# Patient Record
Sex: FEMALE | Race: OTHER | HISPANIC OR LATINO
[De-identification: names, ages, dates, MRNs, and addresses within clinical notes are randomized per-mention and may not be internally consistent; named-entity substitution may affect disease eponyms.]

---

## 2018-04-26 ENCOUNTER — APPOINTMENT (OUTPATIENT)
Dept: ANTEPARTUM | Facility: CLINIC | Age: 23
End: 2018-04-26
Payer: MEDICAID

## 2018-04-26 ENCOUNTER — ASOB RESULT (OUTPATIENT)
Age: 23
End: 2018-04-26

## 2018-04-26 PROCEDURE — 76817 TRANSVAGINAL US OBSTETRIC: CPT

## 2018-05-02 ENCOUNTER — APPOINTMENT (OUTPATIENT)
Dept: ANTEPARTUM | Facility: CLINIC | Age: 23
End: 2018-05-02
Payer: MEDICAID

## 2018-05-02 ENCOUNTER — ASOB RESULT (OUTPATIENT)
Age: 23
End: 2018-05-02

## 2018-05-02 PROCEDURE — 76817 TRANSVAGINAL US OBSTETRIC: CPT

## 2018-06-18 ENCOUNTER — APPOINTMENT (OUTPATIENT)
Dept: ANTEPARTUM | Facility: CLINIC | Age: 23
End: 2018-06-18
Payer: MEDICAID

## 2018-06-18 ENCOUNTER — ASOB RESULT (OUTPATIENT)
Age: 23
End: 2018-06-18

## 2018-06-18 PROCEDURE — 76813 OB US NUCHAL MEAS 1 GEST: CPT

## 2018-06-18 PROCEDURE — 76801 OB US < 14 WKS SINGLE FETUS: CPT

## 2018-06-18 PROCEDURE — 36416 COLLJ CAPILLARY BLOOD SPEC: CPT

## 2018-06-22 LAB
1ST TRIMESTER DATA: NORMAL
ADDENDUM DOC: NORMAL
AFP PNL SERPL: NORMAL
AFP SERPL-ACNC: NORMAL
CLINICAL BIOCHEMIST REVIEW: NORMAL
FREE BETA HCG 1ST TRIMESTER: NORMAL
Lab: NORMAL
NASAL BONE: PRESENT
NOTES NTD: NORMAL
NT: NORMAL
PAPP-A SERPL-ACNC: NORMAL
TRISOMY 18/3: NORMAL

## 2018-08-13 ENCOUNTER — ASOB RESULT (OUTPATIENT)
Age: 23
End: 2018-08-13

## 2018-08-13 ENCOUNTER — APPOINTMENT (OUTPATIENT)
Dept: ANTEPARTUM | Facility: CLINIC | Age: 23
End: 2018-08-13
Payer: MEDICAID

## 2018-08-13 PROCEDURE — 76811 OB US DETAILED SNGL FETUS: CPT

## 2018-08-13 PROCEDURE — 76817 TRANSVAGINAL US OBSTETRIC: CPT

## 2018-10-15 ENCOUNTER — ASOB RESULT (OUTPATIENT)
Age: 23
End: 2018-10-15

## 2018-10-15 ENCOUNTER — APPOINTMENT (OUTPATIENT)
Dept: ANTEPARTUM | Facility: CLINIC | Age: 23
End: 2018-10-15
Payer: MEDICAID

## 2018-10-15 PROCEDURE — 76816 OB US FOLLOW-UP PER FETUS: CPT

## 2018-11-09 ENCOUNTER — APPOINTMENT (OUTPATIENT)
Dept: ANTEPARTUM | Facility: CLINIC | Age: 23
End: 2018-11-09
Payer: MEDICAID

## 2018-11-09 ENCOUNTER — ASOB RESULT (OUTPATIENT)
Age: 23
End: 2018-11-09

## 2018-11-09 ENCOUNTER — APPOINTMENT (OUTPATIENT)
Dept: ANTEPARTUM | Facility: CLINIC | Age: 23
End: 2018-11-09

## 2018-11-09 ENCOUNTER — APPOINTMENT (OUTPATIENT)
Dept: MATERNAL FETAL MEDICINE | Facility: CLINIC | Age: 23
End: 2018-11-09
Payer: MEDICAID

## 2018-11-09 VITALS
WEIGHT: 113 LBS | BODY MASS INDEX: 24.38 KG/M2 | HEIGHT: 57 IN | SYSTOLIC BLOOD PRESSURE: 88 MMHG | DIASTOLIC BLOOD PRESSURE: 52 MMHG | HEART RATE: 66 BPM

## 2018-11-09 DIAGNOSIS — O26.613 LIVER AND BILIARY TRACT DISORDERS IN PREGNANCY, THIRD TRIMESTER: ICD-10-CM

## 2018-11-09 DIAGNOSIS — Z87.898 PERSONAL HISTORY OF OTHER SPECIFIED CONDITIONS: ICD-10-CM

## 2018-11-09 DIAGNOSIS — K83.1 LIVER AND BILIARY TRACT DISORDERS IN PREGNANCY, THIRD TRIMESTER: ICD-10-CM

## 2018-11-09 DIAGNOSIS — Z3A.33 33 WEEKS GESTATION OF PREGNANCY: ICD-10-CM

## 2018-11-09 DIAGNOSIS — Z78.9 OTHER SPECIFIED HEALTH STATUS: ICD-10-CM

## 2018-11-09 DIAGNOSIS — Z87.39 PERSONAL HISTORY OF OTHER DISEASES OF THE MUSCULOSKELETAL SYSTEM AND CONNECTIVE TISSUE: ICD-10-CM

## 2018-11-09 DIAGNOSIS — A74.9 CHLAMYDIAL INFECTION, UNSPECIFIED: ICD-10-CM

## 2018-11-09 DIAGNOSIS — Z87.19 PERSONAL HISTORY OF OTHER COMPLICATIONS OF PREGNANCY, CHILDBIRTH AND THE PUERPERIUM: ICD-10-CM

## 2018-11-09 DIAGNOSIS — Z82.49 FAMILY HISTORY OF ISCHEMIC HEART DISEASE AND OTHER DISEASES OF THE CIRCULATORY SYSTEM: ICD-10-CM

## 2018-11-09 DIAGNOSIS — Z82.5 FAMILY HISTORY OF ASTHMA AND OTHER CHRONIC LOWER RESPIRATORY DISEASES: ICD-10-CM

## 2018-11-09 DIAGNOSIS — Z87.59 PERSONAL HISTORY OF OTHER COMPLICATIONS OF PREGNANCY, CHILDBIRTH AND THE PUERPERIUM: ICD-10-CM

## 2018-11-09 PROCEDURE — 76816 OB US FOLLOW-UP PER FETUS: CPT

## 2018-11-09 PROCEDURE — 99203 OFFICE O/P NEW LOW 30 MIN: CPT

## 2018-11-09 PROCEDURE — 76819 FETAL BIOPHYS PROFIL W/O NST: CPT

## 2018-11-09 RX ORDER — URSODIOL 250 MG/1
250 TABLET ORAL 3 TIMES DAILY
Refills: 0 | Status: ACTIVE | COMMUNITY
Start: 2018-11-09

## 2018-11-09 RX ORDER — MULTIVIT-MIN/FOLIC/VIT K/LYCOP 400-300MCG
28-0.8 TABLET ORAL DAILY
Refills: 0 | Status: ACTIVE | COMMUNITY
Start: 2018-11-09

## 2018-11-13 ENCOUNTER — APPOINTMENT (OUTPATIENT)
Dept: ANTEPARTUM | Facility: CLINIC | Age: 23
End: 2018-11-13
Payer: MEDICAID

## 2018-11-13 ENCOUNTER — ASOB RESULT (OUTPATIENT)
Age: 23
End: 2018-11-13

## 2018-11-13 PROCEDURE — 76815 OB US LIMITED FETUS(S): CPT

## 2018-11-13 PROCEDURE — 76819 FETAL BIOPHYS PROFIL W/O NST: CPT

## 2018-11-15 ENCOUNTER — APPOINTMENT (OUTPATIENT)
Dept: ANTEPARTUM | Facility: CLINIC | Age: 23
End: 2018-11-15
Payer: MEDICAID

## 2018-11-15 ENCOUNTER — ASOB RESULT (OUTPATIENT)
Age: 23
End: 2018-11-15

## 2018-11-15 PROCEDURE — 76818 FETAL BIOPHYS PROFILE W/NST: CPT

## 2018-11-20 ENCOUNTER — APPOINTMENT (OUTPATIENT)
Dept: ANTEPARTUM | Facility: CLINIC | Age: 23
End: 2018-11-20

## 2018-11-26 ENCOUNTER — APPOINTMENT (OUTPATIENT)
Dept: ANTEPARTUM | Facility: CLINIC | Age: 23
End: 2018-11-26
Payer: MEDICAID

## 2018-11-26 ENCOUNTER — APPOINTMENT (OUTPATIENT)
Dept: ANTEPARTUM | Facility: CLINIC | Age: 23
End: 2018-11-26

## 2018-11-26 ENCOUNTER — ASOB RESULT (OUTPATIENT)
Age: 23
End: 2018-11-26

## 2018-11-26 PROCEDURE — 76815 OB US LIMITED FETUS(S): CPT

## 2018-11-26 PROCEDURE — 76818 FETAL BIOPHYS PROFILE W/NST: CPT

## 2018-11-27 ENCOUNTER — APPOINTMENT (OUTPATIENT)
Dept: ANTEPARTUM | Facility: CLINIC | Age: 23
End: 2018-11-27

## 2018-12-03 ENCOUNTER — APPOINTMENT (OUTPATIENT)
Dept: ANTEPARTUM | Facility: CLINIC | Age: 23
End: 2018-12-03
Payer: MEDICAID

## 2018-12-03 ENCOUNTER — ASOB RESULT (OUTPATIENT)
Age: 23
End: 2018-12-03

## 2018-12-03 PROCEDURE — 76818 FETAL BIOPHYS PROFILE W/NST: CPT

## 2018-12-03 PROCEDURE — 76816 OB US FOLLOW-UP PER FETUS: CPT

## 2018-12-04 ENCOUNTER — APPOINTMENT (OUTPATIENT)
Dept: ANTEPARTUM | Facility: CLINIC | Age: 23
End: 2018-12-04

## 2018-12-06 ENCOUNTER — INPATIENT (INPATIENT)
Facility: HOSPITAL | Age: 23
LOS: 2 days | Discharge: HOME | End: 2018-12-09
Attending: OBSTETRICS & GYNECOLOGY | Admitting: OBSTETRICS & GYNECOLOGY
Payer: MEDICAID

## 2018-12-06 VITALS — DIASTOLIC BLOOD PRESSURE: 76 MMHG | HEART RATE: 105 BPM | SYSTOLIC BLOOD PRESSURE: 124 MMHG

## 2018-12-06 DIAGNOSIS — O47.1 FALSE LABOR AT OR AFTER 37 COMPLETED WEEKS OF GESTATION: ICD-10-CM

## 2018-12-06 RX ORDER — SODIUM CHLORIDE 9 MG/ML
1000 INJECTION, SOLUTION INTRAVENOUS
Qty: 0 | Refills: 0 | Status: DISCONTINUED | OUTPATIENT
Start: 2018-12-06 | End: 2018-12-07

## 2018-12-06 RX ORDER — SODIUM CHLORIDE 9 MG/ML
1000 INJECTION, SOLUTION INTRAVENOUS ONCE
Qty: 0 | Refills: 0 | Status: DISCONTINUED | OUTPATIENT
Start: 2018-12-06 | End: 2018-12-07

## 2018-12-06 RX ORDER — OXYTOCIN 10 UNIT/ML
333.33 VIAL (ML) INJECTION
Qty: 20 | Refills: 0 | Status: COMPLETED | OUTPATIENT
Start: 2018-12-06

## 2018-12-06 RX ORDER — CITRIC ACID/SODIUM CITRATE 300-500 MG
30 SOLUTION, ORAL ORAL ONCE
Qty: 0 | Refills: 0 | Status: DISCONTINUED | OUTPATIENT
Start: 2018-12-06 | End: 2018-12-07

## 2018-12-06 NOTE — OB PROVIDER H&P - ASSESSMENT
Pt is a 22yo  at 38w1d consistent with LMP presents to L&D for induction of labor for cholestasis via pitocin    AURE: 18  Prenatal course complicated by cholestasis.    OB hx: Vd x1 Gyn hx: hx of abnormal PAP PMHx/PSHx: anemia, parvovirus, scoliosis Meds: PNV Allergies: NKDA  	GBS: neg HIV: NR RPR: NR Rubella: non- immune HepB: NR ABO: A+

## 2018-12-06 NOTE — OB PROVIDER H&P - NSHPLABSRESULTS_GEN_ALL_CORE
Vital Signs Last 24 Hrs  T(C): 36.8 (06 Dec 2018 22:28), Max: 36.8 (06 Dec 2018 22:28)  T(F): 98.2 (06 Dec 2018 22:28), Max: 98.2 (06 Dec 2018 22:28)  HR: 89 (06 Dec 2018 22:28) (89 - 105)  BP: 127/76 (06 Dec 2018 22:28) (124/76 - 127/76)  BP(mean): --  RR: 20 (06 Dec 2018 22:28) (20 - 20)  SpO2: --

## 2018-12-06 NOTE — OB PROVIDER H&P - HISTORY OF PRESENT ILLNESS
Pt is a 22yo  at 38w1d consistent with LMP presents to L&D for induction of labor for cholestasis.    AURE: 18  Prenatal course complicated by cholestasis.

## 2018-12-06 NOTE — OB PROVIDER IHI INDUCTION/AUGMENTATION NOTE - NS_CHECKALL_OBGYN_ALL_OB
FHR was reviewed/H&P was completed/Contractions pattern was reviewed/Induction / Augmentation was discussed/Order was written

## 2018-12-06 NOTE — OB PROVIDER H&P - ATTENDING COMMENTS
I have fully discussed this case with Dr. Haas. I agree with the plan of action for induction of labor secondary to a dx of cholestasis of pregnancy. The pt was examined and found to be 3cm dilated. We will start IV pitocin to augment labor

## 2018-12-07 LAB
APPEARANCE UR: CLEAR — SIGNIFICANT CHANGE UP
BACTERIA # UR AUTO: ABNORMAL
BASOPHILS # BLD AUTO: 0 K/UL — SIGNIFICANT CHANGE UP (ref 0–0.2)
BASOPHILS # BLD AUTO: 0 K/UL — SIGNIFICANT CHANGE UP (ref 0–0.2)
BASOPHILS NFR BLD AUTO: 0.2 % — SIGNIFICANT CHANGE UP (ref 0–2)
BASOPHILS NFR BLD AUTO: 0.3 % — SIGNIFICANT CHANGE UP (ref 0–2)
BILIRUB UR-MCNC: ABNORMAL
BLD GP AB SCN SERPL QL: SIGNIFICANT CHANGE UP
COLOR SPEC: ABNORMAL
COMMENT - URINE: SIGNIFICANT CHANGE UP
DIFF PNL FLD: ABNORMAL
EOSINOPHIL # BLD AUTO: 0 K/UL — SIGNIFICANT CHANGE UP (ref 0–0.5)
EOSINOPHIL # BLD AUTO: 0 K/UL — SIGNIFICANT CHANGE UP (ref 0–0.5)
EOSINOPHIL NFR BLD AUTO: 0.3 % — SIGNIFICANT CHANGE UP (ref 0–6)
EOSINOPHIL NFR BLD AUTO: 0.3 % — SIGNIFICANT CHANGE UP (ref 0–6)
EPI CELLS # UR: SIGNIFICANT CHANGE UP
GLUCOSE UR QL: NEGATIVE MG/DL — SIGNIFICANT CHANGE UP
HCT VFR BLD CALC: 30.4 % — LOW (ref 37–47)
HCT VFR BLD CALC: 33.1 % — LOW (ref 37–47)
HGB BLD-MCNC: 10 G/DL — LOW (ref 12–16)
HGB BLD-MCNC: 10.7 G/DL — LOW (ref 12–16)
KETONES UR-MCNC: ABNORMAL
LEUKOCYTE ESTERASE UR-ACNC: ABNORMAL
LYMPHOCYTES # BLD AUTO: 1.5 K/UL — SIGNIFICANT CHANGE UP (ref 1–4.8)
LYMPHOCYTES # BLD AUTO: 1.7 K/UL — SIGNIFICANT CHANGE UP (ref 1–4.8)
LYMPHOCYTES # BLD AUTO: 12.3 % — LOW (ref 20–55)
LYMPHOCYTES # BLD AUTO: 19.1 % — LOW (ref 20–55)
MCHC RBC-ENTMCNC: 26 PG — LOW (ref 27–31)
MCHC RBC-ENTMCNC: 26.9 PG — LOW (ref 27–31)
MCHC RBC-ENTMCNC: 32.3 G/DL — SIGNIFICANT CHANGE UP (ref 32–36)
MCHC RBC-ENTMCNC: 32.9 G/DL — SIGNIFICANT CHANGE UP (ref 32–36)
MCV RBC AUTO: 80.3 FL — LOW (ref 81–99)
MCV RBC AUTO: 81.7 FL — SIGNIFICANT CHANGE UP (ref 81–99)
MONOCYTES # BLD AUTO: 0.6 K/UL — SIGNIFICANT CHANGE UP (ref 0–0.8)
MONOCYTES # BLD AUTO: 0.9 K/UL — HIGH (ref 0–0.8)
MONOCYTES NFR BLD AUTO: 7 % — SIGNIFICANT CHANGE UP (ref 3–10)
MONOCYTES NFR BLD AUTO: 7.3 % — SIGNIFICANT CHANGE UP (ref 3–10)
NEUTROPHILS # BLD AUTO: 6.4 K/UL — SIGNIFICANT CHANGE UP (ref 1.8–8)
NEUTROPHILS # BLD AUTO: 9.5 K/UL — HIGH (ref 1.8–8)
NEUTROPHILS NFR BLD AUTO: 72.4 % — SIGNIFICANT CHANGE UP (ref 37–73)
NEUTROPHILS NFR BLD AUTO: 79.1 % — HIGH (ref 37–73)
NITRITE UR-MCNC: NEGATIVE — SIGNIFICANT CHANGE UP
PH UR: 6.5 — SIGNIFICANT CHANGE UP (ref 5–8)
PLATELET # BLD AUTO: 297 K/UL — SIGNIFICANT CHANGE UP (ref 150–400)
PLATELET # BLD AUTO: 338 K/UL — SIGNIFICANT CHANGE UP (ref 150–400)
PROT UR-MCNC: 30 MG/DL
RBC # BLD: 3.72 M/UL — LOW (ref 4.4–5.2)
RBC # BLD: 4.12 M/UL — LOW (ref 4.4–5.2)
RBC # FLD: 14.2 % — SIGNIFICANT CHANGE UP (ref 11–15.6)
RBC # FLD: 14.3 % — SIGNIFICANT CHANGE UP (ref 11–15.6)
RBC CASTS # UR COMP ASSIST: ABNORMAL /HPF (ref 0–4)
SP GR SPEC: 1.01 — SIGNIFICANT CHANGE UP (ref 1.01–1.02)
T PALLIDUM AB TITR SER: NEGATIVE — SIGNIFICANT CHANGE UP
TYPE + AB SCN PNL BLD: SIGNIFICANT CHANGE UP
UROBILINOGEN FLD QL: 12 MG/DL
WBC # BLD: 12 K/UL — HIGH (ref 4.8–10.8)
WBC # BLD: 8.9 K/UL — SIGNIFICANT CHANGE UP (ref 4.8–10.8)
WBC # FLD AUTO: 12 K/UL — HIGH (ref 4.8–10.8)
WBC # FLD AUTO: 8.9 K/UL — SIGNIFICANT CHANGE UP (ref 4.8–10.8)
WBC UR QL: SIGNIFICANT CHANGE UP

## 2018-12-07 RX ORDER — MAGNESIUM HYDROXIDE 400 MG/1
30 TABLET, CHEWABLE ORAL
Qty: 0 | Refills: 0 | Status: DISCONTINUED | OUTPATIENT
Start: 2018-12-07 | End: 2018-12-09

## 2018-12-07 RX ORDER — GLYCERIN ADULT
1 SUPPOSITORY, RECTAL RECTAL AT BEDTIME
Qty: 0 | Refills: 0 | Status: DISCONTINUED | OUTPATIENT
Start: 2018-12-07 | End: 2018-12-09

## 2018-12-07 RX ORDER — SODIUM CHLORIDE 9 MG/ML
3 INJECTION INTRAMUSCULAR; INTRAVENOUS; SUBCUTANEOUS EVERY 8 HOURS
Qty: 0 | Refills: 0 | Status: DISCONTINUED | OUTPATIENT
Start: 2018-12-07 | End: 2018-12-09

## 2018-12-07 RX ORDER — ACETAMINOPHEN 500 MG
650 TABLET ORAL EVERY 6 HOURS
Qty: 0 | Refills: 0 | Status: DISCONTINUED | OUTPATIENT
Start: 2018-12-07 | End: 2018-12-09

## 2018-12-07 RX ORDER — TETANUS TOXOID, REDUCED DIPHTHERIA TOXOID AND ACELLULAR PERTUSSIS VACCINE, ADSORBED 5; 2.5; 8; 8; 2.5 [IU]/.5ML; [IU]/.5ML; UG/.5ML; UG/.5ML; UG/.5ML
0.5 SUSPENSION INTRAMUSCULAR ONCE
Qty: 0 | Refills: 0 | Status: DISCONTINUED | OUTPATIENT
Start: 2018-12-07 | End: 2018-12-09

## 2018-12-07 RX ORDER — OXYTOCIN 10 UNIT/ML
41.67 VIAL (ML) INJECTION
Qty: 20 | Refills: 0 | Status: DISCONTINUED | OUTPATIENT
Start: 2018-12-07 | End: 2018-12-09

## 2018-12-07 RX ORDER — BUTORPHANOL TARTRATE 2 MG/ML
1 INJECTION, SOLUTION INTRAMUSCULAR; INTRAVENOUS ONCE
Qty: 0 | Refills: 0 | Status: DISCONTINUED | OUTPATIENT
Start: 2018-12-07 | End: 2018-12-07

## 2018-12-07 RX ORDER — DIPHENHYDRAMINE HCL 50 MG
25 CAPSULE ORAL EVERY 6 HOURS
Qty: 0 | Refills: 0 | Status: DISCONTINUED | OUTPATIENT
Start: 2018-12-07 | End: 2018-12-09

## 2018-12-07 RX ORDER — DOCUSATE SODIUM 100 MG
100 CAPSULE ORAL
Qty: 0 | Refills: 0 | Status: DISCONTINUED | OUTPATIENT
Start: 2018-12-07 | End: 2018-12-09

## 2018-12-07 RX ORDER — OXYTOCIN 10 UNIT/ML
2 VIAL (ML) INJECTION
Qty: 30 | Refills: 0 | Status: DISCONTINUED | OUTPATIENT
Start: 2018-12-07 | End: 2018-12-09

## 2018-12-07 RX ORDER — SIMETHICONE 80 MG/1
80 TABLET, CHEWABLE ORAL EVERY 6 HOURS
Qty: 0 | Refills: 0 | Status: DISCONTINUED | OUTPATIENT
Start: 2018-12-07 | End: 2018-12-09

## 2018-12-07 RX ORDER — LANOLIN
1 OINTMENT (GRAM) TOPICAL EVERY 6 HOURS
Qty: 0 | Refills: 0 | Status: DISCONTINUED | OUTPATIENT
Start: 2018-12-07 | End: 2018-12-09

## 2018-12-07 RX ORDER — PRAMOXINE HYDROCHLORIDE 150 MG/15G
1 AEROSOL, FOAM RECTAL EVERY 4 HOURS
Qty: 0 | Refills: 0 | Status: DISCONTINUED | OUTPATIENT
Start: 2018-12-07 | End: 2018-12-09

## 2018-12-07 RX ORDER — OXYCODONE AND ACETAMINOPHEN 5; 325 MG/1; MG/1
2 TABLET ORAL EVERY 6 HOURS
Qty: 0 | Refills: 0 | Status: DISCONTINUED | OUTPATIENT
Start: 2018-12-07 | End: 2018-12-09

## 2018-12-07 RX ORDER — IBUPROFEN 200 MG
600 TABLET ORAL EVERY 6 HOURS
Qty: 0 | Refills: 0 | Status: DISCONTINUED | OUTPATIENT
Start: 2018-12-07 | End: 2018-12-09

## 2018-12-07 RX ORDER — HYDROCORTISONE 1 %
1 OINTMENT (GRAM) TOPICAL EVERY 4 HOURS
Qty: 0 | Refills: 0 | Status: DISCONTINUED | OUTPATIENT
Start: 2018-12-07 | End: 2018-12-09

## 2018-12-07 RX ORDER — AER TRAVELER 0.5 G/1
1 SOLUTION RECTAL; TOPICAL EVERY 4 HOURS
Qty: 0 | Refills: 0 | Status: DISCONTINUED | OUTPATIENT
Start: 2018-12-07 | End: 2018-12-09

## 2018-12-07 RX ORDER — DIBUCAINE 1 %
1 OINTMENT (GRAM) RECTAL EVERY 4 HOURS
Qty: 0 | Refills: 0 | Status: DISCONTINUED | OUTPATIENT
Start: 2018-12-07 | End: 2018-12-09

## 2018-12-07 RX ORDER — OXYTOCIN 10 UNIT/ML
333.33 VIAL (ML) INJECTION
Qty: 20 | Refills: 0 | Status: DISCONTINUED | OUTPATIENT
Start: 2018-12-07 | End: 2018-12-09

## 2018-12-07 RX ADMIN — Medication 2 MILLIUNIT(S)/MIN: at 01:39

## 2018-12-07 RX ADMIN — Medication 600 MILLIGRAM(S): at 15:43

## 2018-12-07 RX ADMIN — SODIUM CHLORIDE 125 MILLILITER(S): 9 INJECTION, SOLUTION INTRAVENOUS at 06:13

## 2018-12-07 RX ADMIN — Medication 600 MILLIGRAM(S): at 14:43

## 2018-12-07 RX ADMIN — Medication 1000 MILLIUNIT(S)/MIN: at 06:59

## 2018-12-07 RX ADMIN — SODIUM CHLORIDE 3 MILLILITER(S): 9 INJECTION INTRAMUSCULAR; INTRAVENOUS; SUBCUTANEOUS at 17:17

## 2018-12-07 RX ADMIN — BUTORPHANOL TARTRATE 1 MILLIGRAM(S): 2 INJECTION, SOLUTION INTRAMUSCULAR; INTRAVENOUS at 05:23

## 2018-12-07 RX ADMIN — Medication 125 MILLIUNIT(S)/MIN: at 07:30

## 2018-12-07 NOTE — CHART NOTE - NSCHARTNOTEFT_GEN_A_CORE
PT seen and examined and bedside.  Pt comfortable, feeling some pain.  Pt does not desire medication at this time.    SVE: 6/70/-2  [place]: viky regularly  FHT: cat I    A/P - d/w Dr. Guallpa  - continue current management  - consider amniotomy at next exam

## 2018-12-07 NOTE — OB PROVIDER DELIVERY SUMMARY - NSPROVIDERDELIVERYNOTE_OBGYN_ALL_OB_FT
Normal spontaneous vaginal delivery  of live  female infant, position DIANA, over intact perineum. Light  meconium noted, no nuchal cord. Head and both shoulders delivered atraumatically followed by rest of body. Cord clamped. Baby handed to mother for STS. Spontaneous delivery of placenta with 3-vessel cord. 1st degree laceration, and periurethral laceration repaired with 2-chromic. EBL is 250. Neonatology present. Apgar 9/9

## 2018-12-07 NOTE — OB RN DELIVERY SUMMARY - NS_RNDELIVATTEST_OBGYN_ALL_OB
Laps, needles and instrument count was correct/OB Provider reported that the vagina was inspected and no foreign body was found

## 2018-12-07 NOTE — CHART NOTE - NSCHARTNOTEFT_GEN_A_CORE
Pt seen and examined at bedside. Pt in pain.  Pt examined, 7/90/-1  Pt still intact  Pt to be given 1mg of stadol  Pt tracing reviewed by Dr. Guallpa  to continue current management

## 2018-12-08 ENCOUNTER — TRANSCRIPTION ENCOUNTER (OUTPATIENT)
Age: 23
End: 2018-12-08

## 2018-12-08 VITALS
DIASTOLIC BLOOD PRESSURE: 81 MMHG | TEMPERATURE: 98 F | HEART RATE: 98 BPM | RESPIRATION RATE: 18 BRPM | SYSTOLIC BLOOD PRESSURE: 124 MMHG

## 2018-12-08 RX ORDER — IBUPROFEN 200 MG
1 TABLET ORAL
Qty: 30 | Refills: 0 | OUTPATIENT
Start: 2018-12-08

## 2018-12-08 RX ADMIN — Medication 600 MILLIGRAM(S): at 02:19

## 2018-12-08 RX ADMIN — Medication 600 MILLIGRAM(S): at 08:40

## 2018-12-08 RX ADMIN — Medication 600 MILLIGRAM(S): at 08:01

## 2018-12-08 RX ADMIN — Medication 1 TABLET(S): at 12:25

## 2018-12-08 RX ADMIN — Medication 600 MILLIGRAM(S): at 03:00

## 2018-12-08 RX ADMIN — Medication 600 MILLIGRAM(S): at 23:58

## 2018-12-08 NOTE — DISCHARGE NOTE OB - HOSPITAL COURSE
24 y/o  now PPD#2 s/p normal spontaneous vaginal delivery. Patient transferred to post partum unit, uncomplicated hospital course. At the time of discharge patient was tolerating regular diet PO, ambulating, voiding, and having bowel movements and flatus. Pain well controlled with pain medications PRN.

## 2018-12-08 NOTE — DISCHARGE NOTE OB - PLAN OF CARE
rapid recovery Patient can transition to regular activity level and continue regular diet. Patient should follow up with [facility] to schedule post partum visit. Patient should contact doctor earlier should she develop persistent/increased vaginal bleeding or fever.

## 2018-12-08 NOTE — DISCHARGE NOTE OB - MATERIALS PROVIDED
[facility] [place] [facility] Screening Program/Back To Sleep Handout/Birth Certificate Instructions/Breastfeeding Mother’s Support Group Information/[place] [facility]  Immunization Record/Breastfeeding Log/Shaken Baby Prevention Handout/[facility] Hearing Screen Program/Discharge Medication Information for Patients and Families Pocket Guide/Tdap Vaccination (VIS Pub Date: 2012)/Breastfeeding Guide and Packet/Vaccinations/MMR Vaccination (VIS Pub Date: 2012)

## 2018-12-08 NOTE — PROGRESS NOTE ADULT - ASSESSMENT
23y year old  s/p  at 38 1/7 wks gestation PPD#1.     Plan  C/w postpartum care  Encourage ambulation & hydration  Encourage breastfeeding, mother/baby interaction  Pain management PRN  Plan for discharge on PPD 2 unless otherwise specified, with postpartum follow up at the office in 4 weeks upon discharge.  DVT prophylaxis: early ambulation

## 2018-12-08 NOTE — PROGRESS NOTE ADULT - SUBJECTIVE AND OBJECTIVE BOX
23y year old  s/p  at 38 1/7 wks gestation PPD#1.   Patient seen and examined at bedside, no acute overnight events.   Patient is ambulating, +eating, +PO hydration, +voiding, +Flatus, -BM, +Formula feeding, +Breast feeding and pain is well controlled.  Denies headache, SOB, fever, chills and calf pain. Patient used 4 pads in 24 hrs, partially soaked    VS:   Vital Signs Last 24 Hrs  T(C): 36.9 (07 Dec 2018 20:01), Max: 36.9 (07 Dec 2018 20:01)  T(F): 98.4 (07 Dec 2018 20:01), Max: 98.4 (07 Dec 2018 20:01)  HR: 77 (07 Dec 2018 20:01) (71 - 90)  BP: 121/74 (07 Dec 2018 20:01) (102/58 - 124/83)  RR: 18 (07 Dec 2018 20:01) (18 - 18)  SpO2: 97% (07 Dec 2018 10:21) (97% - 97%)    Physical Exam:  General: NAD  CVS: RRR, +S1/S2  Lungs: CTAB, no wheeze, ronchi or rales.   Abdomen: +BS, soft, ND, minimally tender, Fundus firm at level of umbilicus.   Pelvic: Minimal lochia  Ext: No cyanosis, edema or calf tenderness.     Labs:                        10.0   12.0  )-----------( 297      ( 07 Dec 2018 18:19 )             30.4     Urinalysis Basic - ( 07 Dec 2018 00:21 )    Color: Jazmin / Appearance: Clear / S.015 / pH: x  Gluc: x / Ketone: Moderate  / Bili: Moderate / Urobili: 12 mg/dL   Blood: x / Protein: 30 mg/dL / Nitrite: Negative   Leuk Esterase: Trace / RBC: 3-5 /HPF / WBC 3-5   Sq Epi: x / Non Sq Epi: Occasional / Bacteria: Occasional        Medication:  MEDICATIONS  (STANDING):  diphtheria/tetanus/pertussis (acellular) Vaccine (ADAcel) 0.5 milliLiter(s) IntraMuscular once  oxytocin Infusion 2 milliUNIT(s)/Min (2 mL/Hr) IV Continuous <Continuous>  oxytocin Infusion 41.667 milliUNIT(s)/Min (125 mL/Hr) IV Continuous <Continuous>  oxytocin Infusion 333.333 milliUNIT(s)/Min (1000 mL/Hr) IV Continuous <Continuous>  prenatal multivitamin 1 Tablet(s) Oral daily  sodium chloride 0.9% lock flush 3 milliLiter(s) IV Push every 8 hours    MEDICATIONS  (PRN):  acetaminophen   Tablet .. 650 milliGRAM(s) Oral every 6 hours PRN Temp greater or equal to 38.5C (101.3F), Mild Pain (1 - 3)  dibucaine 1% Ointment 1 Application(s) Topical every 4 hours PRN Perineal Discomfort  diphenhydrAMINE 25 milliGRAM(s) Oral every 6 hours PRN Itching  docusate sodium 100 milliGRAM(s) Oral two times a day PRN Stool Softening  glycerin Suppository - Adult 1 Suppository(s) Rectal at bedtime PRN Constipation  hydrocortisone 1% Cream 1 Application(s) Topical every 4 hours PRN Moderate to Severe Perineal Pain  ibuprofen  Tablet. 600 milliGRAM(s) Oral every 6 hours PRN Moderate Pain (4 - 6)  lanolin Ointment 1 Application(s) Topical every 6 hours PRN Sore Nipples  magnesium hydroxide Suspension 30 milliLiter(s) Oral two times a day PRN Constipation  oxyCODONE    5 mG/acetaminophen 325 mG 2 Tablet(s) Oral every 6 hours PRN Severe Pain (7 - 10)  pramoxine 1%/zinc 5% Cream 1 Application(s) Topical every 4 hours PRN Moderate to Severe Perineal Pain  simethicone 80 milliGRAM(s) Chew every 6 hours PRN Gas  witch hazel Pads 1 Application(s) Topical every 4 hours PRN Perineal Discomfort

## 2018-12-08 NOTE — DISCHARGE NOTE OB - MEDICATION SUMMARY - MEDICATIONS TO STOP TAKING
I will STOP taking the medications listed below when I get home from the hospital:    ibuprofen 600 mg oral tablet  -- 1 tab(s) by mouth every 6 hours, As needed, Moderate Pain

## 2018-12-08 NOTE — DISCHARGE NOTE OB - CARE PROVIDER_API CALL
Mindy Hartley), Obstetrics and Gynecology  [facility] [place] [address]  Phone: [phone]  Fax: [phone]

## 2018-12-08 NOTE — DISCHARGE NOTE OB - CARE PLAN
Principal Discharge DX:	 (normal spontaneous vaginal delivery)  Goal:	rapid recovery  Assessment and plan of treatment:	Patient can transition to regular activity level and continue regular diet. Patient should follow up with [facility] to schedule post partum visit. Patient should contact doctor earlier should she develop persistent/increased vaginal bleeding or fever.

## 2018-12-09 RX ADMIN — Medication 600 MILLIGRAM(S): at 00:40

## 2018-12-09 RX ADMIN — Medication 0.5 MILLILITER(S): at 12:22

## 2018-12-09 NOTE — PROGRESS NOTE ADULT - SUBJECTIVE AND OBJECTIVE BOX
A/P 23y on PPD#2 s/p , stable    ambulating, breastfeeding, lochia decreased, voiding, tolerating PO  Vital Signs Last 24 Hrs  T(C): 36.6 (08 Dec 2018 20:01), Max: 36.6 (08 Dec 2018 20:01)  T(F): 97.9 (08 Dec 2018 20:01), Max: 97.9 (08 Dec 2018 20:01)  HR: 98 (08 Dec 2018 20:01) (98 - 98)  BP: 124/81 (08 Dec 2018 20:01) (124/81 - 124/81)  BP(mean): --  RR: 18 (08 Dec 2018 20:01) (18 - 18)  SpO2: --    PHYSICAL EXAM:    CHEST/LUNG: Clear to percussion bilaterally; No rales, rhonchi, wheezing, or rubs  HEART: Regular rate and rhythm; No murmurs, rubs, or gallops  BREASTS: deferred  ABDOMEN: Soft, Nontender, Nondistended; fundus is firm and Bowel sounds present  PERINEUM: Intact  and lochia minimal  EXTREMITIES:  2+ Peripheral Pulses, No clubbing, cyanosis, or edema, no CT    MEDICATIONS  (STANDING):  diphtheria/tetanus/pertussis (acellular) Vaccine (ADAcel) 0.5 milliLiter(s) IntraMuscular once  oxytocin Infusion 2 milliUNIT(s)/Min (2 mL/Hr) IV Continuous <Continuous>  oxytocin Infusion 41.667 milliUNIT(s)/Min (125 mL/Hr) IV Continuous <Continuous>  oxytocin Infusion 333.333 milliUNIT(s)/Min (1000 mL/Hr) IV Continuous <Continuous>  prenatal multivitamin 1 Tablet(s) Oral daily  sodium chloride 0.9% lock flush 3 milliLiter(s) IV Push every 8 hours    MEDICATIONS  (PRN):  acetaminophen   Tablet .. 650 milliGRAM(s) Oral every 6 hours PRN Temp greater or equal to 38.5C (101.3F), Mild Pain (1 - 3)  dibucaine 1% Ointment 1 Application(s) Topical every 4 hours PRN Perineal Discomfort  diphenhydrAMINE 25 milliGRAM(s) Oral every 6 hours PRN Itching  docusate sodium 100 milliGRAM(s) Oral two times a day PRN Stool Softening  glycerin Suppository - Adult 1 Suppository(s) Rectal at bedtime PRN Constipation  hydrocortisone 1% Cream 1 Application(s) Topical every 4 hours PRN Moderate to Severe Perineal Pain  ibuprofen  Tablet. 600 milliGRAM(s) Oral every 6 hours PRN Moderate Pain (4 - 6)  lanolin Ointment 1 Application(s) Topical every 6 hours PRN Sore Nipples  magnesium hydroxide Suspension 30 milliLiter(s) Oral two times a day PRN Constipation  oxyCODONE    5 mG/acetaminophen 325 mG 2 Tablet(s) Oral every 6 hours PRN Severe Pain (7 - 10)  pramoxine 1%/zinc 5% Cream 1 Application(s) Topical every 4 hours PRN Moderate to Severe Perineal Pain  simethicone 80 milliGRAM(s) Chew every 6 hours PRN Gas  witch hazel Pads 1 Application(s) Topical every 4 hours PRN Perineal Discomfort        LABS:                        10.0   12.0  )-----------( 297      ( 07 Dec 2018 18:19 )             30.4       1. Pain: well controlled on OPM  2. GI: Regular diet  3. : voiding  4. DVT prophylaxis: ambulate  5. Dispo: D/C home. PP precautions discussed.

## 2018-12-09 NOTE — PROGRESS NOTE ADULT - SUBJECTIVE AND OBJECTIVE BOX
23y year old  s/p  at 38.1wks gestation PPD#2.   Patient seen and examined at bedside, no acute overnight events.   Patient is ambulating, +eating, +PO hydration, +voiding, +Flatus, -BM,  +Breast feeding and pain is well controlled.  Denies headache, SOB, fever, chills and calf pain.    Vital Signs Last 24 Hrs  T(C): 36.6 (08 Dec 2018 20:01), Max: 36.6 (08 Dec 2018 20:01)  T(F): 97.9 (08 Dec 2018 20:01), Max: 97.9 (08 Dec 2018 20:01)  HR: 98 (08 Dec 2018 20:01) (98 - 98)  BP: 124/81 (08 Dec 2018 20:01) (124/81 - 124/81)  BP(mean): --  RR: 18 (08 Dec 2018 20:01) (18 - 18)  SpO2: --    Physical Exam:  General: NAD  CVS: RRR, +S1/S2  Lungs: CTAB, no wheeze, rhonchi or rales.   Abdomen: +BS, soft, ND, minimally tender, Fundus firm at level of umbilicus.   Pelvic: Minimal lochia  Ext: No cyanosis, edema or calf tenderness.     Labs:                        10.0   12.0  )-----------( 297      ( 07 Dec 2018 18:19 )             30.4         Medication:  MEDICATIONS  (STANDING):  diphtheria/tetanus/pertussis (acellular) Vaccine (ADAcel) 0.5 milliLiter(s) IntraMuscular once  oxytocin Infusion 2 milliUNIT(s)/Min (2 mL/Hr) IV Continuous <Continuous>  oxytocin Infusion 41.667 milliUNIT(s)/Min (125 mL/Hr) IV Continuous <Continuous>  oxytocin Infusion 333.333 milliUNIT(s)/Min (1000 mL/Hr) IV Continuous <Continuous>  prenatal multivitamin 1 Tablet(s) Oral daily  sodium chloride 0.9% lock flush 3 milliLiter(s) IV Push every 8 hours    MEDICATIONS  (PRN):  acetaminophen   Tablet .. 650 milliGRAM(s) Oral every 6 hours PRN Temp greater or equal to 38.5C (101.3F), Mild Pain (1 - 3)  dibucaine 1% Ointment 1 Application(s) Topical every 4 hours PRN Perineal Discomfort  diphenhydrAMINE 25 milliGRAM(s) Oral every 6 hours PRN Itching  docusate sodium 100 milliGRAM(s) Oral two times a day PRN Stool Softening  glycerin Suppository - Adult 1 Suppository(s) Rectal at bedtime PRN Constipation  hydrocortisone 1% Cream 1 Application(s) Topical every 4 hours PRN Moderate to Severe Perineal Pain  ibuprofen  Tablet. 600 milliGRAM(s) Oral every 6 hours PRN Moderate Pain (4 - 6)  lanolin Ointment 1 Application(s) Topical every 6 hours PRN Sore Nipples  magnesium hydroxide Suspension 30 milliLiter(s) Oral two times a day PRN Constipation  oxyCODONE    5 mG/acetaminophen 325 mG 2 Tablet(s) Oral every 6 hours PRN Severe Pain (7 - 10)  pramoxine 1%/zinc 5% Cream 1 Application(s) Topical every 4 hours PRN Moderate to Severe Perineal Pain  simethicone 80 milliGRAM(s) Chew every 6 hours PRN Gas  witch hazel Pads 1 Application(s) Topical every 4 hours PRN Perineal Discomfort

## 2018-12-09 NOTE — PROGRESS NOTE ADULT - PROBLEM SELECTOR PLAN 1
- Continue with current management  - Encourage ambulation and hydration  - Encourage mother/baby interaction and breast feeding  - Plan for D/C at PPD#2

## 2018-12-11 ENCOUNTER — APPOINTMENT (OUTPATIENT)
Dept: ANTEPARTUM | Facility: CLINIC | Age: 23
End: 2018-12-11

## 2018-12-18 ENCOUNTER — APPOINTMENT (OUTPATIENT)
Dept: ANTEPARTUM | Facility: CLINIC | Age: 23
End: 2018-12-18

## 2019-01-09 PROCEDURE — 81001 URINALYSIS AUTO W/SCOPE: CPT

## 2019-01-09 PROCEDURE — 90707 MMR VACCINE SC: CPT

## 2019-01-09 PROCEDURE — 86780 TREPONEMA PALLIDUM: CPT

## 2019-01-09 PROCEDURE — 86901 BLOOD TYPING SEROLOGIC RH(D): CPT

## 2019-01-09 PROCEDURE — G0463: CPT

## 2019-01-09 PROCEDURE — 36415 COLL VENOUS BLD VENIPUNCTURE: CPT

## 2019-01-09 PROCEDURE — 86900 BLOOD TYPING SEROLOGIC ABO: CPT

## 2019-01-09 PROCEDURE — 59050 FETAL MONITOR W/REPORT: CPT

## 2019-01-09 PROCEDURE — 85027 COMPLETE CBC AUTOMATED: CPT

## 2019-01-09 PROCEDURE — 59025 FETAL NON-STRESS TEST: CPT

## 2019-01-09 PROCEDURE — 86850 RBC ANTIBODY SCREEN: CPT

## 2022-06-24 NOTE — DISCHARGE NOTE OB - VISION (WITH CORRECTIVE LENSES IF THE PATIENT USUALLY WEARS THEM):
Normal vision: sees adequately in most situations; can see medication labels, newsprint
Anibal Cardoso

## 2024-08-08 NOTE — OB PROVIDER H&P - PRO VDRL INFANT
negative Include Data From Each Zone In Note: No Scale (Lower Limbs To Buttocks): 0 Erythema (Upper Limbs): 2 Surface % Of Upper Limb Involvement: 10 Detail Level: Zone Enter Bsa By Region As Palm Units Or Percentage Of Region Involved: percentage of region Include Bsa Calculation In Note: Yes